# Patient Record
Sex: FEMALE | Race: BLACK OR AFRICAN AMERICAN | NOT HISPANIC OR LATINO | Employment: STUDENT | URBAN - METROPOLITAN AREA
[De-identification: names, ages, dates, MRNs, and addresses within clinical notes are randomized per-mention and may not be internally consistent; named-entity substitution may affect disease eponyms.]

---
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
## 2024-01-18 ENCOUNTER — APPOINTMENT (OUTPATIENT)
Dept: OBSTETRICS AND GYNECOLOGY | Facility: CLINIC | Age: 19
End: 2024-01-18
Payer: COMMERCIAL

## 2024-01-22 ENCOUNTER — DOCUMENTATION (OUTPATIENT)
Dept: CASE MANAGEMENT | Facility: HOSPITAL | Age: 19
End: 2024-01-22
Payer: COMMERCIAL

## 2024-01-22 NOTE — PROGRESS NOTES
Steven is a 18yo now  who delivered at 24.1wga on  after a fetal echo showing absent cardiac activity on 12/15. Parent Bereavement Program Intern, Griffin Luque, called Steven to provide bereavement support follow-up. Her phone went to  and Griffin left a message with the Program/Destiny Mcneil's direct contact information ([phone]).     PLAN: Will continue to be available for bereavement support and services as needed.      24 at 4:52 PM - Griffin Luque    This note has been reviewed and approved by Griffin Luque's supervisor:  Destiny Mcneil, ARLETH, LISW, CGP  Director of Parent Bereavement Programs    Please call [phone] or secure chat Destiny Mcneil with any questions or concerns.

## 2024-01-29 LAB — SCAN RESULT: NORMAL

## 2024-02-01 ENCOUNTER — DOCUMENTATION (OUTPATIENT)
Dept: CASE MANAGEMENT | Facility: HOSPITAL | Age: 19
End: 2024-02-01
Payer: COMMERCIAL

## 2024-02-01 NOTE — PROGRESS NOTES
Steven is a 20yo now  who delivered at 24.1wga on  after a fetal echo showing absent cardiac activity on 12/15. Parent Bereavement Program Intern, Griffin Luque, called Steven to provide bereavement support follow-up. Her phone went to  and Griffin left a message with the Program/Destiny Mcneil's direct contact information ([phone]).     PLAN: Will continue to be available for bereavement support and services as needed.      24 at 1:17 PM - Griffin Luque    This note has been reviewed and approved by Griffin Luque's supervisor:  Destiny Mcneil, MSSCAYDEN, LISW, CGP  Director of Parent Bereavement Programs    Please call [phone] or secure chat Destiny Mcneil with any questions or concerns.

## 2024-02-08 ENCOUNTER — DOCUMENTATION (OUTPATIENT)
Dept: CASE MANAGEMENT | Facility: HOSPITAL | Age: 19
End: 2024-02-08
Payer: COMMERCIAL

## 2024-02-08 NOTE — PROGRESS NOTES
Steven is a 20yo now  who delivered at 24.1wga on  after a fetal echo showing absent cardiac activity on 12/15. Parent Bereavement Program Intern, Griffin Luque, emailed Steven to provide bereavement support follow-up. Griffin emailed a message with the Program/Destiny Mcneil's direct contact information ([phone]) and words of support.    PLAN: Will continue to be available for bereavement support and services as needed.      24 at 11:50 AM - Griffin Luque    This note has been reviewed and approved by Griffin Luque's supervisor:  Destiny Mcneil, ARLETH, LISW, CGP  Director of Parent Bereavement Programs    Please call [phone] or secure chat Destiny Mcneil with any questions or concerns.

## 2024-02-23 LAB
LABORATORY COMMENT REPORT: NORMAL
PATH REPORT.ADDENDUM SPEC: NORMAL
PATH REPORT.FINAL DX SPEC: NORMAL
PATH REPORT.GROSS SPEC: NORMAL
PATH REPORT.MICROSCOPIC SPEC OTHER STN: NORMAL
PATH REPORT.RELEVANT HX SPEC: NORMAL
PATH REPORT.RELEVANT HX SPEC: NORMAL
PATH REPORT.TOTAL CANCER: NORMAL
RESIDENT REVIEW: NORMAL
RPT COMMENT SECTION: NORMAL

## 2024-03-14 ENCOUNTER — TELEPHONE (OUTPATIENT)
Dept: GENETICS | Facility: CLINIC | Age: 19
End: 2024-03-14

## 2024-03-14 NOTE — TELEPHONE ENCOUNTER
Genetics note:    Ms. Taylor did not show up to the follow-up appointment. One day prior to the appointment, our clinic called Ms. Taylor who confirmed that she will come to the clinic for a follow-up.     Whole genome sequencing for pregnancy loss came back negative. I was planning to discuss the implications of these results for her next pregnancy as well as WGS re-analysis as an option.     Report and a letter to patient to be mailed to her address.     Flaca Salas MD  Medical Geneticist

## 2024-06-12 ENCOUNTER — PHARMACY VISIT (OUTPATIENT)
Dept: PHARMACY | Facility: CLINIC | Age: 19
End: 2024-06-12
Payer: MEDICAID

## 2024-06-12 ENCOUNTER — HOSPITAL ENCOUNTER (EMERGENCY)
Facility: HOSPITAL | Age: 19
Discharge: HOME | End: 2024-06-12
Attending: EMERGENCY MEDICINE
Payer: COMMERCIAL

## 2024-06-12 VITALS
WEIGHT: 165 LBS | DIASTOLIC BLOOD PRESSURE: 71 MMHG | OXYGEN SATURATION: 100 % | RESPIRATION RATE: 16 BRPM | SYSTOLIC BLOOD PRESSURE: 121 MMHG | HEIGHT: 64 IN | BODY MASS INDEX: 28.17 KG/M2 | HEART RATE: 76 BPM | TEMPERATURE: 97.7 F

## 2024-06-12 DIAGNOSIS — A59.9 TRICHOMONAS INFECTION: ICD-10-CM

## 2024-06-12 DIAGNOSIS — N30.00 ACUTE CYSTITIS WITHOUT HEMATURIA: Primary | ICD-10-CM

## 2024-06-12 LAB
APPEARANCE UR: ABNORMAL
BILIRUB UR STRIP.AUTO-MCNC: NEGATIVE MG/DL
CLUE CELLS SPEC QL WET PREP: PRESENT
COLOR UR: YELLOW
GLUCOSE UR STRIP.AUTO-MCNC: NORMAL MG/DL
HCV AB SER QL: NONREACTIVE
HIV 1+2 AB+HIV1 P24 AG SERPL QL IA: NONREACTIVE
HOLD SPECIMEN: NORMAL
KETONES UR STRIP.AUTO-MCNC: NEGATIVE MG/DL
LEUKOCYTE ESTERASE UR QL STRIP.AUTO: ABNORMAL
MUCOUS THREADS #/AREA URNS AUTO: ABNORMAL /LPF
NITRITE UR QL STRIP.AUTO: NEGATIVE
PH UR STRIP.AUTO: 6 [PH]
PREGNANCY TEST URINE, POC: NEGATIVE
PROT UR STRIP.AUTO-MCNC: ABNORMAL MG/DL
RBC # UR STRIP.AUTO: NEGATIVE /UL
RBC #/AREA URNS AUTO: ABNORMAL /HPF
SP GR UR STRIP.AUTO: 1.02
SQUAMOUS #/AREA URNS AUTO: ABNORMAL /HPF
T VAGINALIS SPEC QL WET PREP: PRESENT
TREPONEMA PALLIDUM IGG+IGM AB [PRESENCE] IN SERUM OR PLASMA BY IMMUNOASSAY: NONREACTIVE
UROBILINOGEN UR STRIP.AUTO-MCNC: ABNORMAL MG/DL
WBC #/AREA URNS AUTO: ABNORMAL /HPF
WBC VAG QL WET PREP: ABNORMAL
YEAST VAG QL WET PREP: ABNORMAL

## 2024-06-12 PROCEDURE — 36415 COLL VENOUS BLD VENIPUNCTURE: CPT

## 2024-06-12 PROCEDURE — 99284 EMERGENCY DEPT VISIT MOD MDM: CPT

## 2024-06-12 PROCEDURE — 81025 URINE PREGNANCY TEST: CPT

## 2024-06-12 PROCEDURE — RXMED WILLOW AMBULATORY MEDICATION CHARGE

## 2024-06-12 PROCEDURE — 87086 URINE CULTURE/COLONY COUNT: CPT

## 2024-06-12 PROCEDURE — 86803 HEPATITIS C AB TEST: CPT

## 2024-06-12 PROCEDURE — 87491 CHLMYD TRACH DNA AMP PROBE: CPT

## 2024-06-12 PROCEDURE — 99283 EMERGENCY DEPT VISIT LOW MDM: CPT

## 2024-06-12 PROCEDURE — 87210 SMEAR WET MOUNT SALINE/INK: CPT

## 2024-06-12 PROCEDURE — 81001 URINALYSIS AUTO W/SCOPE: CPT

## 2024-06-12 PROCEDURE — 87389 HIV-1 AG W/HIV-1&-2 AB AG IA: CPT

## 2024-06-12 PROCEDURE — 86780 TREPONEMA PALLIDUM: CPT

## 2024-06-12 PROCEDURE — 2500000001 HC RX 250 WO HCPCS SELF ADMINISTERED DRUGS (ALT 637 FOR MEDICARE OP): Mod: SE

## 2024-06-12 RX ORDER — METRONIDAZOLE 500 MG/1
500 TABLET ORAL ONCE
Status: COMPLETED | OUTPATIENT
Start: 2024-06-12 | End: 2024-06-12

## 2024-06-12 RX ORDER — CEPHALEXIN 500 MG/1
500 CAPSULE ORAL 2 TIMES DAILY
Qty: 10 CAPSULE | Refills: 0 | Status: SHIPPED | OUTPATIENT
Start: 2024-06-12 | End: 2024-06-17

## 2024-06-12 RX ORDER — CEPHALEXIN 250 MG/1
500 CAPSULE ORAL ONCE
Status: COMPLETED | OUTPATIENT
Start: 2024-06-12 | End: 2024-06-12

## 2024-06-12 RX ORDER — METRONIDAZOLE 500 MG/1
500 TABLET ORAL 2 TIMES DAILY
Qty: 14 TABLET | Refills: 0 | Status: SHIPPED | OUTPATIENT
Start: 2024-06-12 | End: 2024-06-19

## 2024-06-12 ASSESSMENT — COLUMBIA-SUICIDE SEVERITY RATING SCALE - C-SSRS
1. IN THE PAST MONTH, HAVE YOU WISHED YOU WERE DEAD OR WISHED YOU COULD GO TO SLEEP AND NOT WAKE UP?: NO
2. HAVE YOU ACTUALLY HAD ANY THOUGHTS OF KILLING YOURSELF?: NO
6. HAVE YOU EVER DONE ANYTHING, STARTED TO DO ANYTHING, OR PREPARED TO DO ANYTHING TO END YOUR LIFE?: NO

## 2024-06-12 NOTE — ED PROVIDER NOTES
HPI   Chief Complaint   Patient presents with    Vaginal Itching       Patient is a 19-year-old female presenting to the ED with vaginal irritation.  Patient endorses 4 days of vaginal irritation and increased clear discharge.  She states she recently changed her laundry detergent from Gain to Tide as well as changed her body soap from Dove to something new 1 week ago.  She states she is still using this new soap.  Patient states her LMP was in mid May and she is not on any form of contraception.  She is sexually active with a male partner.  Patient denies any abnormal vaginal bleeding, sores, or lesions.  She otherwise denies any fever/chills, flulike symptoms, abdominal pain, nausea/vomiting, or changes in urinary or bowel habits.                [place] Coma Scale Score: 15                     Patient History   Past Medical History:   Diagnosis Date    Chlamydia     Gonorrhea      No past surgical history on file.  Family History   Problem Relation Name Age of Onset    No Known Problems Mother      No Known Problems Father      Asthma Brother      Eczema Brother      Colon cancer Paternal Grandmother  50    Diabetes Maternal Great-Grandmother       Social History     Tobacco Use    Smoking status: Never    Smokeless tobacco: Never   Vaping Use    Vaping status: Never Used   Substance Use Topics    Alcohol use: Not Currently    Drug use: Not Currently       Physical Exam   ED Triage Vitals [06/12/24 0959]   Temperature Heart Rate Respirations BP   36.5 °C (97.7 °F) 76 16 121/71      Pulse Ox Temp src Heart Rate Source Patient Position   100 % -- -- Sitting      BP Location FiO2 (%)     Right arm --       Physical Exam  Vitals reviewed. Exam conducted with a chaperone present (KEIRA Galdamez).   Constitutional:       General: She is not in acute distress.     Appearance: She is not ill-appearing.   Cardiovascular:      Rate and Rhythm: Normal rate and regular rhythm.   Pulmonary:      Effort: Pulmonary effort is  normal. No respiratory distress.      Breath sounds: Normal breath sounds.   Abdominal:      General: Bowel sounds are normal.      Palpations: Abdomen is soft.      Tenderness: There is no abdominal tenderness. There is no guarding or rebound.   Genitourinary:     Vagina: Vaginal discharge (milky white) present. No tenderness, bleeding or lesions.      Cervix: No cervical motion tenderness, friability or lesion.      Uterus: Normal.       Adnexa: Right adnexa normal and left adnexa normal.        Right: No mass, tenderness or fullness.          Left: No mass, tenderness or fullness.     Skin:     General: Skin is warm and dry.   Neurological:      General: No focal deficit present.      Mental Status: She is alert.         ED Course & MDM   Diagnoses as of 06/12/24 1222   Acute cystitis without hematuria   Trichomonas infection     Labs Reviewed   TRICHOMONAS WET PREP REFLEX TO PCR - Abnormal       Result Value    Trichomonas Present (*)     Clue Cells Present (*)     Yeast None Seen      WBC 11-20     URINALYSIS WITH REFLEX CULTURE AND MICROSCOPIC - Abnormal    Color, Urine Yellow      Appearance, Urine Turbid (*)     Specific Gravity, Urine 1.024      pH, Urine 6.0      Protein, Urine 20 (TRACE)      Glucose, Urine Normal      Blood, Urine NEGATIVE      Ketones, Urine NEGATIVE      Bilirubin, Urine NEGATIVE      Urobilinogen, Urine 4 (2+) (*)     Nitrite, Urine NEGATIVE      Leukocyte Esterase, Urine 500 Hay/µL (*)    MICROSCOPIC ONLY, URINE - Abnormal    WBC, Urine 11-20 (*)     RBC, Urine 6-10 (*)     Squamous Epithelial Cells, Urine 10-25 (FEW)      Mucus, Urine 2+     HIV 1/2 ANTIGEN/ANTIBODY SCREEN WIH REFLEX TO CONFIRMATION - Normal    HIV 1/2 Antigen/Antibody Screen with Reflex to Confirmation Nonreactive      Narrative:     HIV Ag/Ab screen is performed using the Siemens [employer] HIV Ag/Ab Combo assay which detects the presence of HIV p24 antigen as well as antibodies to HIV-1 (Group M and O) and  HIV-2.  No laboratory evidence of HIV infection. If acute HIV infection is suspected, consider testing for HIV RNA by PCR (viral load).   POCT PREGNANCY, URINE - Normal    Preg Test, Ur Negative     URINE CULTURE   C. TRACHOMATIS + N. GONORRHOEAE, AMPLIFIED   URINALYSIS WITH REFLEX CULTURE AND MICROSCOPIC    Narrative:     The following orders were created for panel order Urinalysis with Reflex Culture and Microscopic.  Procedure                               Abnormality         Status                     ---------                               -----------         ------                     Urinalysis with Reflex C...[[id number]]  Abnormal            Final result               Extra Urine Gray Tube[[id number]]                            In process                   Please view results for these tests on the individual orders.   SYPHILIS SCREENING WITH REFLEX   HEPATITIS C ANTIBODY   EXTRA URINE GRAY TUBE       Medical Decision Making  Patient is a 19-year-old female presenting to the ED with vaginal irritation.  History was obtained from the patient.  Endorses 4 days of vaginal irritation and increased clear discharge.  Recently changed with her laundry detergent and body soap.  LMP mid May and not on any form of contraception.  Sexually active with a male partner.  Denies abnormal bleeding, sores, lesions, or abdominal pain.  On physical exam, patient is sitting comfortably and in no apparent distress.  Abdomen is soft and nontender with normal bowel sounds.  No rebound, guarding, or peritoneal signs.  Pelvic exam reveals significant milky white discharge within the vagina.  No tenderness, bleeding, or lesions.  Cervix normal without CMT.  Bilateral adnexa without masses, fullness, or tenderness.  Remainder of exam as noted above.  Patient is afebrile and vital signs are stable.    Patient's exam raises high suspicion for an STD.  I have low clinical suspicion for cervicitis, PID, or TOA.  She did request the full  STD workup, which was ordered.  Urine pregnancy test negative.  Urinalysis positive for leukocytes.  Wet prep positive for clue cells and trichomonas.  Remainder of STD panel pending.  Patient was informed of these results.  She was given a dose of Keflex for her UTI and Flagyl for the trichomonas/BV.  She remained stable and ready for discharge at this time.  Patient was sent continued treatment of Keflex and Flagyl to take as indicated.  She will be called and notified with the remainder of her STD workup in the coming days.  Patient is to refrain from sexual intercourse until a week after completing her antibiotics.  She verbalizes understanding.  Return precautions were discussed.  She is agreeable and understanding to the plan and all of her questions were answered to satisfaction.        Procedure  Procedures     Colleen Talbot PA-C  06/12/24 6868

## 2024-06-12 NOTE — DISCHARGE INSTRUCTIONS
Your urine was positive for an infection.  Your STD swabs were positive for trichomonas.  The gonorrhea and chlamydia swab will not result until tomorrow.  You will be called and notified with these results.  You received antibiotics for both urinary infection and trichomonas here in the ED.  I also sent the remaining course to the [facility] pharmacy.  Keflex is used for the UTI and you are to take 1 capsule twice daily for the next 5 days.  Flagyl is for trichomonas and you are to take 1 capsule twice daily for the next 7 days.  Please take the full course of both of these antibiotics.

## 2024-06-13 LAB
BACTERIA UR CULT: NORMAL
C TRACH RRNA SPEC QL NAA+PROBE: NEGATIVE
N GONORRHOEA DNA SPEC QL PROBE+SIG AMP: POSITIVE

## 2024-06-15 ENCOUNTER — TELEPHONE (OUTPATIENT)
Dept: PHARMACY | Facility: HOSPITAL | Age: 19
End: 2024-06-15
Payer: COMMERCIAL

## 2024-06-15 NOTE — TELEPHONE ENCOUNTER
I reviewed the progress note and agree with the resident’s findings and plans as written. Case discussed with resident.    Meaghan Velasco, PrestonD

## 2024-06-15 NOTE — PROGRESS NOTES
EDPD Note: Initiation     Contacted Steven Taylor regarding a positive Gonorrhea culture/result that was taken during their recent emergency room visit. I completed education with patient . The patient is not being treated appropriately.    Patient was recommended to return to ED for single dose of Rocephin 500mg IM injection.    Patient previously aware of (+) Trichomonas and BV cultures. Being treated appropriately with Metronidazole 500mg BID x7 days. Recommended to complete course of therapy and follow-up w/PCP if Sx do not fully resolve.    Patient expressed understanding-no questions.    No further follow up needed from EDPD Team. If there are any other questions for the ED Post-Discharge Culture Follow Up Team, please contact [phone]. Fax: [phone].    Amy Marina RPh

## 2024-06-15 NOTE — TELEPHONE ENCOUNTER
EDPD Note: Negative Results    The EDPD Post-Discharge Team was called regarding Steven Taylor  Syphilis, HIV, Hep C, Chlamydia, and Urine culture/result that was taken during their recent emergency room visit. I gave patient  their results. The culture/result were negative and there were no other questions at this time.    Since Urine culture showed no growth, patient instructed to discontinue Keflex, as it is not indicated at this time.    If there are any other questions for the ED Post-Discharge Culture Follow Up Team, please contact [phone]. Fax: [phone].    Amy Marina [facility]

## 2024-07-23 ENCOUNTER — HOSPITAL ENCOUNTER (EMERGENCY)
Facility: HOSPITAL | Age: 19
Discharge: HOME | End: 2024-07-23
Payer: COMMERCIAL

## 2024-07-23 VITALS
OXYGEN SATURATION: 98 % | WEIGHT: 165 LBS | BODY MASS INDEX: 28.32 KG/M2 | HEART RATE: 85 BPM | RESPIRATION RATE: 16 BRPM | TEMPERATURE: 97.5 F | DIASTOLIC BLOOD PRESSURE: 76 MMHG | SYSTOLIC BLOOD PRESSURE: 117 MMHG

## 2024-07-23 DIAGNOSIS — R10.13 EPIGASTRIC PAIN: Primary | ICD-10-CM

## 2024-07-23 LAB
ALBUMIN SERPL BCP-MCNC: 4 G/DL (ref 3.4–5)
ALP SERPL-CCNC: 55 U/L (ref 33–110)
ALT SERPL W P-5'-P-CCNC: 9 U/L (ref 7–45)
ANION GAP SERPL CALC-SCNC: 12 MMOL/L (ref 10–20)
APPEARANCE UR: CLEAR
AST SERPL W P-5'-P-CCNC: 14 U/L (ref 9–39)
BASOPHILS # BLD AUTO: 0.03 X10*3/UL (ref 0–0.1)
BASOPHILS NFR BLD AUTO: 0.4 %
BILIRUB SERPL-MCNC: 0.6 MG/DL (ref 0–1.2)
BILIRUB UR STRIP.AUTO-MCNC: NEGATIVE MG/DL
BUN SERPL-MCNC: 7 MG/DL (ref 6–23)
CALCIUM SERPL-MCNC: 8.7 MG/DL (ref 8.6–10.6)
CHLORIDE SERPL-SCNC: 107 MMOL/L (ref 98–107)
CLUE CELLS SPEC QL WET PREP: NORMAL
CO2 SERPL-SCNC: 25 MMOL/L (ref 21–32)
COLOR UR: ABNORMAL
CREAT SERPL-MCNC: 0.73 MG/DL (ref 0.5–1.05)
EGFRCR SERPLBLD CKD-EPI 2021: >90 ML/MIN/1.73M*2
EOSINOPHIL # BLD AUTO: 0.09 X10*3/UL (ref 0–0.7)
EOSINOPHIL NFR BLD AUTO: 1.2 %
ERYTHROCYTE [DISTWIDTH] IN BLOOD BY AUTOMATED COUNT: 15.8 % (ref 11.5–14.5)
GLUCOSE SERPL-MCNC: 85 MG/DL (ref 74–99)
GLUCOSE UR STRIP.AUTO-MCNC: NORMAL MG/DL
HCT VFR BLD AUTO: 37.1 % (ref 36–46)
HCV AB SER QL: NONREACTIVE
HGB BLD-MCNC: 11.6 G/DL (ref 12–16)
HIV 1+2 AB+HIV1 P24 AG SERPL QL IA: NONREACTIVE
IMM GRANULOCYTES # BLD AUTO: 0.01 X10*3/UL (ref 0–0.7)
IMM GRANULOCYTES NFR BLD AUTO: 0.1 % (ref 0–0.9)
KETONES UR STRIP.AUTO-MCNC: NEGATIVE MG/DL
LEUKOCYTE ESTERASE UR QL STRIP.AUTO: ABNORMAL
LIPASE SERPL-CCNC: 42 U/L (ref 9–82)
LYMPHOCYTES # BLD AUTO: 1.96 X10*3/UL (ref 1.2–4.8)
LYMPHOCYTES NFR BLD AUTO: 25.9 %
MCH RBC QN AUTO: 26.4 PG (ref 26–34)
MCHC RBC AUTO-ENTMCNC: 31.3 G/DL (ref 32–36)
MCV RBC AUTO: 85 FL (ref 80–100)
MONOCYTES # BLD AUTO: 0.35 X10*3/UL (ref 0.1–1)
MONOCYTES NFR BLD AUTO: 4.6 %
MUCOUS THREADS #/AREA URNS AUTO: NORMAL /LPF
NEUTROPHILS # BLD AUTO: 5.13 X10*3/UL (ref 1.2–7.7)
NEUTROPHILS NFR BLD AUTO: 67.8 %
NITRITE UR QL STRIP.AUTO: NEGATIVE
NRBC BLD-RTO: 0 /100 WBCS (ref 0–0)
PH UR STRIP.AUTO: 7 [PH]
PLATELET # BLD AUTO: 185 X10*3/UL (ref 150–450)
POTASSIUM SERPL-SCNC: 4.5 MMOL/L (ref 3.5–5.3)
PREGNANCY TEST URINE, POC: NEGATIVE
PROT SERPL-MCNC: 7.3 G/DL (ref 6.4–8.2)
PROT UR STRIP.AUTO-MCNC: ABNORMAL MG/DL
RBC # BLD AUTO: 4.39 X10*6/UL (ref 4–5.2)
RBC # UR STRIP.AUTO: NEGATIVE /UL
RBC #/AREA URNS AUTO: NORMAL /HPF
SODIUM SERPL-SCNC: 139 MMOL/L (ref 136–145)
SP GR UR STRIP.AUTO: 1.03
SQUAMOUS #/AREA URNS AUTO: NORMAL /HPF
T VAGINALIS SPEC QL WET PREP: NORMAL
TREPONEMA PALLIDUM IGG+IGM AB [PRESENCE] IN SERUM OR PLASMA BY IMMUNOASSAY: NONREACTIVE
UROBILINOGEN UR STRIP.AUTO-MCNC: ABNORMAL MG/DL
WBC # BLD AUTO: 7.6 X10*3/UL (ref 4.4–11.3)
WBC #/AREA URNS AUTO: NORMAL /HPF
WBC VAG QL WET PREP: NORMAL
YEAST VAG QL WET PREP: NORMAL

## 2024-07-23 PROCEDURE — 87389 HIV-1 AG W/HIV-1&-2 AB AG IA: CPT | Performed by: PHYSICIAN ASSISTANT

## 2024-07-23 PROCEDURE — 80053 COMPREHEN METABOLIC PANEL: CPT | Performed by: PHYSICIAN ASSISTANT

## 2024-07-23 PROCEDURE — 2500000001 HC RX 250 WO HCPCS SELF ADMINISTERED DRUGS (ALT 637 FOR MEDICARE OP): Mod: SE | Performed by: PHYSICIAN ASSISTANT

## 2024-07-23 PROCEDURE — 81025 URINE PREGNANCY TEST: CPT | Performed by: PHYSICIAN ASSISTANT

## 2024-07-23 PROCEDURE — 2500000005 HC RX 250 GENERAL PHARMACY W/O HCPCS: Mod: SE | Performed by: PHYSICIAN ASSISTANT

## 2024-07-23 PROCEDURE — 86780 TREPONEMA PALLIDUM: CPT | Performed by: PHYSICIAN ASSISTANT

## 2024-07-23 PROCEDURE — 85025 COMPLETE CBC W/AUTO DIFF WBC: CPT | Performed by: PHYSICIAN ASSISTANT

## 2024-07-23 PROCEDURE — 87210 SMEAR WET MOUNT SALINE/INK: CPT | Performed by: PHYSICIAN ASSISTANT

## 2024-07-23 PROCEDURE — 99283 EMERGENCY DEPT VISIT LOW MDM: CPT

## 2024-07-23 PROCEDURE — 87491 CHLMYD TRACH DNA AMP PROBE: CPT | Performed by: PHYSICIAN ASSISTANT

## 2024-07-23 PROCEDURE — 36415 COLL VENOUS BLD VENIPUNCTURE: CPT | Performed by: PHYSICIAN ASSISTANT

## 2024-07-23 PROCEDURE — 87086 URINE CULTURE/COLONY COUNT: CPT | Performed by: PHYSICIAN ASSISTANT

## 2024-07-23 PROCEDURE — 81001 URINALYSIS AUTO W/SCOPE: CPT | Performed by: PHYSICIAN ASSISTANT

## 2024-07-23 PROCEDURE — 99284 EMERGENCY DEPT VISIT MOD MDM: CPT | Performed by: PHYSICIAN ASSISTANT

## 2024-07-23 PROCEDURE — 86803 HEPATITIS C AB TEST: CPT | Performed by: PHYSICIAN ASSISTANT

## 2024-07-23 PROCEDURE — 83690 ASSAY OF LIPASE: CPT | Performed by: PHYSICIAN ASSISTANT

## 2024-07-23 RX ORDER — DICYCLOMINE HYDROCHLORIDE 10 MG/1
20 CAPSULE ORAL ONCE
Status: COMPLETED | OUTPATIENT
Start: 2024-07-23 | End: 2024-07-23

## 2024-07-23 RX ORDER — DICYCLOMINE HYDROCHLORIDE 20 MG/1
20 TABLET ORAL 2 TIMES DAILY
Qty: 20 TABLET | Refills: 0 | Status: SHIPPED | OUTPATIENT
Start: 2024-07-23 | End: 2024-08-02

## 2024-07-23 ASSESSMENT — COLUMBIA-SUICIDE SEVERITY RATING SCALE - C-SSRS
6. HAVE YOU EVER DONE ANYTHING, STARTED TO DO ANYTHING, OR PREPARED TO DO ANYTHING TO END YOUR LIFE?: NO
2. HAVE YOU ACTUALLY HAD ANY THOUGHTS OF KILLING YOURSELF?: NO
1. IN THE PAST MONTH, HAVE YOU WISHED YOU WERE DEAD OR WISHED YOU COULD GO TO SLEEP AND NOT WAKE UP?: NO

## 2024-07-23 NOTE — ED PROVIDER NOTES
HPI   Chief Complaint   Patient presents with    Abdominal Pain    Exposure to STD       HPI: Patient is a 19-year-old female who is otherwise healthy presents to the ED for abdominal pain and STD check.  Patient states that her abdominal pain started yesterday.  She localizes it to the epigastric region and left upper quadrant of her abdomen.  She notes associated diarrhea but denies any nausea, vomiting, fevers, chills, dysuria, hematuria, constipation, vaginal discharge.  She denies eating anything abnormal prior to her symptoms occurring yesterday.  States that patient would also like checked for STDs while she is here.  ------------------------------------------------------------------------------------------------------------------------------------------  ROS: a ten point review of systems was performed and was negative except as per HPI.  ------------------------------------------------------------------------------------------------------------------------------------------  PMH / PSH: as per HPI, otherwise reviewed   MEDS: as per HPI, otherwise reviewed in EMR  ALLERGIES: as per HPI, otherwise reviewed in EMR  SocH:  as per HPI, otherwise reviewed in EMR  FH:  as per HPI, otherwise reviewed in EMR   ------------------------------------------------------------------------------------------------------------------------------------------  Physical Exam:  VS: As documented in the triage note and EMR flowsheet from this visit was reviewed  General: Well appearing. No acute distress.   Eyes:  Extraocular movements grossly intact. No scleral icterus.   Head: Atraumatic. Normocephalic.     Neck: No meningismus. No gross masses. Full movement through range of motion  CV: Regular rhythm. No murmurs, rubs, gallops appreciated.   Resp: Clear to auscultation bilaterally. No respiratory distress.    GI: Nontender. Soft. No masses. No rebound, rigidity or guarding.   MSK: Symmetric muscle bulk. No gross step offs or  deformities.  Skin: Warm, dry. No rashes  Neuro: CN II-VII intact. A&O x3. Speech fluent. Alert. Moving all extremities. Ambulates with normal gait  Psych: Appropriate mood and affect for situation  ------------------------------------------------------------------------------------------------------------------------------------------  Hospital Course / Medical Decision Making: Patient is a 19-year-old female who presents to the ED for abdominal pain and STD check. Patient endorsing pain to her left upper quadrant and epigastrium.  On examination is well-appearing.  Vitals are stable.  Abdominal examination is benign.  Notes associated diarrhea but denies any other symptoms.  Patient administered Bentyl and BMX for symptom management.  Differential diagnosis includes but is not limited to gastritis versus pancreatitis versus dyspepsia versus viral gastroenteritis.  CBC showed no evidence of leukocytosis, stable anemia noted with hemoglobin of 11.6.  CMP without renal, electrolyte or hepatic abnormality.  Lipase within normal range.  Urinalysis without evidence of infection.  Wet prep negative for any clue cells, trichomonas or yeast.  On reassessment, patient is feeling improved.  She was written a prescription for Bentyl. As a result of the work-up, the patient was discharged home in stable condition.  They were informed of the diagnosis and instructed to come back with any concerns or worsening of condition.  Agreeable to the plan as discussed above.  Patient given the opportunity to ask questions.  All of the patient's questions were answered.               Patient History   Past Medical History:   Diagnosis Date    Chlamydia     Gonorrhea      No past surgical history on file.  Family History   Problem Relation Name Age of Onset    No Known Problems Mother      No Known Problems Father      Asthma Brother      Eczema Brother      Colon cancer Paternal Grandmother  50    Diabetes Maternal Great-Grandmother        Social History     Tobacco Use    Smoking status: Never    Smokeless tobacco: Never   Vaping Use    Vaping status: Never Used   Substance Use Topics    Alcohol use: Not Currently    Drug use: Not Currently       Physical Exam   ED Triage Vitals [07/23/24 1831]   Temperature Heart Rate Respirations BP   36.4 °C (97.5 °F) 85 16 117/76      Pulse Ox Temp src Heart Rate Source Patient Position   98 % -- -- Sitting      BP Location FiO2 (%)     Right arm --       Physical Exam      ED Course & MDM   Diagnoses as of 07/23/24 2232   Epigastric pain                       [place] Coma Scale Score: 15                        Medical Decision Making      Procedure  Procedures     Julissa Mcclelland PA-C  07/23/24 2234

## 2024-07-24 LAB
C TRACH RRNA SPEC QL NAA+PROBE: POSITIVE
N GONORRHOEA DNA SPEC QL PROBE+SIG AMP: NEGATIVE

## 2024-07-25 LAB — BACTERIA UR CULT: NORMAL

## 2024-07-26 ENCOUNTER — TELEPHONE (OUTPATIENT)
Dept: PHARMACY | Facility: HOSPITAL | Age: 19
End: 2024-07-26
Payer: COMMERCIAL

## 2024-07-26 DIAGNOSIS — A74.9 CHLAMYDIA: Primary | ICD-10-CM

## 2024-07-26 RX ORDER — DOXYCYCLINE 100 MG/1
100 CAPSULE ORAL 2 TIMES DAILY
Qty: 14 CAPSULE | Refills: 0 | Status: SHIPPED | OUTPATIENT
Start: 2024-07-26 | End: 2024-08-02

## 2024-07-26 NOTE — PROGRESS NOTES
EDPD Note: Initiation     Contacted Steven Taylor regarding a positive Chlamydia culture/result that was taken during their recent emergency room visit. I completed education with patient . The patient is not being treated appropriately. Patient was encouraged to abstain from sexual intercourse for at least 7-14 days or after completion of treatment.  Patient was also encouraged to talk to their sex partners so they can seek treatment as well. Patient made aware that if they experience any signs/symptoms to go to ED for further evaluation. Made aware to follow up with PCP. Patient verbalized understanding and had no further questions or concerns.  The following prescription was sent to the patient's preferred pharmacy. No further follow up needed from EDPD Team.     Patient declined expedited partner therapy services at this time.     Drug: Doxycycline 100mg  Si tab BID   Qty: 14  Days Supply: 7    If there are any other questions for the ED Post-Discharge Culture Follow Up Team, please contact [phone]. Fax: [phone].    Mariah Bryant, PharmD

## 2024-10-11 ENCOUNTER — TELEPHONE (OUTPATIENT)
Dept: PRIMARY CARE | Facility: CLINIC | Age: 19
End: 2024-10-11
Payer: COMMERCIAL

## 2024-10-18 ENCOUNTER — HOSPITAL ENCOUNTER (EMERGENCY)
Facility: HOSPITAL | Age: 19
Discharge: HOME | End: 2024-10-18
Attending: EMERGENCY MEDICINE
Payer: COMMERCIAL

## 2024-10-18 VITALS
HEIGHT: 65 IN | DIASTOLIC BLOOD PRESSURE: 87 MMHG | SYSTOLIC BLOOD PRESSURE: 132 MMHG | HEART RATE: 90 BPM | OXYGEN SATURATION: 98 % | WEIGHT: 151 LBS | BODY MASS INDEX: 25.16 KG/M2 | TEMPERATURE: 99.3 F | RESPIRATION RATE: 18 BRPM

## 2024-10-18 DIAGNOSIS — M54.10 RADICULAR LOW BACK PAIN: Primary | ICD-10-CM

## 2024-10-18 DIAGNOSIS — N76.0 BACTERIAL VAGINOSIS: ICD-10-CM

## 2024-10-18 DIAGNOSIS — B96.89 BACTERIAL VAGINOSIS: ICD-10-CM

## 2024-10-18 LAB
APPEARANCE UR: ABNORMAL
BILIRUB UR STRIP.AUTO-MCNC: NEGATIVE MG/DL
CLUE CELLS SPEC QL WET PREP: PRESENT
COLOR UR: YELLOW
GLUCOSE UR STRIP.AUTO-MCNC: NORMAL MG/DL
HCG UR QL IA.RAPID: NEGATIVE
KETONES UR STRIP.AUTO-MCNC: NEGATIVE MG/DL
LEUKOCYTE ESTERASE UR QL STRIP.AUTO: ABNORMAL
MUCOUS THREADS #/AREA URNS AUTO: ABNORMAL /LPF
NITRITE UR QL STRIP.AUTO: NEGATIVE
PH UR STRIP.AUTO: 7 [PH]
PROT UR STRIP.AUTO-MCNC: ABNORMAL MG/DL
RBC # UR STRIP.AUTO: ABNORMAL /UL
RBC #/AREA URNS AUTO: >20 /HPF
SP GR UR STRIP.AUTO: 1.03
SQUAMOUS #/AREA URNS AUTO: ABNORMAL /HPF
T VAGINALIS SPEC QL WET PREP: ABNORMAL
UROBILINOGEN UR STRIP.AUTO-MCNC: ABNORMAL MG/DL
WBC #/AREA URNS AUTO: ABNORMAL /HPF
WBC VAG QL WET PREP: ABNORMAL
YEAST VAG QL WET PREP: ABNORMAL

## 2024-10-18 PROCEDURE — 87210 SMEAR WET MOUNT SALINE/INK: CPT

## 2024-10-18 PROCEDURE — 99283 EMERGENCY DEPT VISIT LOW MDM: CPT

## 2024-10-18 PROCEDURE — 81003 URINALYSIS AUTO W/O SCOPE: CPT

## 2024-10-18 PROCEDURE — 81025 URINE PREGNANCY TEST: CPT

## 2024-10-18 PROCEDURE — 2500000004 HC RX 250 GENERAL PHARMACY W/ HCPCS (ALT 636 FOR OP/ED): Mod: SE

## 2024-10-18 PROCEDURE — 99284 EMERGENCY DEPT VISIT MOD MDM: CPT | Performed by: EMERGENCY MEDICINE

## 2024-10-18 PROCEDURE — 96372 THER/PROPH/DIAG INJ SC/IM: CPT

## 2024-10-18 PROCEDURE — 87086 URINE CULTURE/COLONY COUNT: CPT

## 2024-10-18 PROCEDURE — 2500000005 HC RX 250 GENERAL PHARMACY W/O HCPCS: Mod: SE

## 2024-10-18 PROCEDURE — 87491 CHLMYD TRACH DNA AMP PROBE: CPT

## 2024-10-18 RX ORDER — LIDOCAINE 560 MG/1
1 PATCH PERCUTANEOUS; TOPICAL; TRANSDERMAL DAILY
Status: DISCONTINUED | OUTPATIENT
Start: 2024-10-18 | End: 2024-10-18 | Stop reason: HOSPADM

## 2024-10-18 RX ORDER — LIDOCAINE 50 MG/G
1 PATCH TOPICAL DAILY
Qty: 5 PATCH | Refills: 0 | Status: SHIPPED | OUTPATIENT
Start: 2024-10-18

## 2024-10-18 RX ORDER — KETOROLAC TROMETHAMINE 15 MG/ML
15 INJECTION, SOLUTION INTRAMUSCULAR; INTRAVENOUS ONCE
Status: COMPLETED | OUTPATIENT
Start: 2024-10-18 | End: 2024-10-18

## 2024-10-18 RX ORDER — METRONIDAZOLE 500 MG/1
500 TABLET ORAL 3 TIMES DAILY
Qty: 21 TABLET | Refills: 0 | Status: SHIPPED | OUTPATIENT
Start: 2024-10-18 | End: 2024-10-25

## 2024-10-18 RX ORDER — KETOROLAC TROMETHAMINE 10 MG/1
10 TABLET, FILM COATED ORAL EVERY 6 HOURS PRN
Qty: 20 TABLET | Refills: 0 | Status: SHIPPED | OUTPATIENT
Start: 2024-10-18 | End: 2024-10-23

## 2024-10-18 ASSESSMENT — COLUMBIA-SUICIDE SEVERITY RATING SCALE - C-SSRS
2. HAVE YOU ACTUALLY HAD ANY THOUGHTS OF KILLING YOURSELF?: NO
6. HAVE YOU EVER DONE ANYTHING, STARTED TO DO ANYTHING, OR PREPARED TO DO ANYTHING TO END YOUR LIFE?: NO
1. IN THE PAST MONTH, HAVE YOU WISHED YOU WERE DEAD OR WISHED YOU COULD GO TO SLEEP AND NOT WAKE UP?: NO

## 2024-10-18 ASSESSMENT — PAIN SCALES - GENERAL: PAINLEVEL_OUTOF10: 5 - MODERATE PAIN

## 2024-10-18 ASSESSMENT — LIFESTYLE VARIABLES
EVER HAD A DRINK FIRST THING IN THE MORNING TO STEADY YOUR NERVES TO GET RID OF A HANGOVER: NO
HAVE PEOPLE ANNOYED YOU BY CRITICIZING YOUR DRINKING: NO
EVER FELT BAD OR GUILTY ABOUT YOUR DRINKING: NO
TOTAL SCORE: 0
HAVE YOU EVER FELT YOU SHOULD CUT DOWN ON YOUR DRINKING: NO

## 2024-10-18 ASSESSMENT — PAIN - FUNCTIONAL ASSESSMENT: PAIN_FUNCTIONAL_ASSESSMENT: 0-10

## 2024-10-18 ASSESSMENT — PAIN DESCRIPTION - LOCATION: LOCATION: BACK

## 2024-10-18 ASSESSMENT — PAIN DESCRIPTION - PAIN TYPE: TYPE: ACUTE PAIN

## 2024-10-18 NOTE — ED PROVIDER NOTES
"Emergency Department Provider Note        History of Present Illness     History provided by: Patient  Limitations to History: None  External Records Reviewed with Brief Summary: None    HPI:  Steven Taylor is a 19 y.o. female with no significant PMH presenting for right back pain. Patient states she works for [employer] and has been dealing with right radicular back pain for the last two months. It was atraumatic in nature. She states yesterday she had a long day at work and when she came home was shaking in pain. She states its always the right side starting near her buttock and radiating down the side of the right leg. She denies numbness, tingling, loss of bowel or bladder function, trauma, midline pain, history of cancer or IVDU. She endorses abnormal vaginal discharge that she first describes as \"slimy\" then as thick white. No dysuria, hematuria, abdominal pain, or nausea.     Physical Exam   Triage vitals:  T 37.4 °C (99.3 °F)  HR 90  /87  RR 18  O2 98 % None (Room air)    General: Awake, alert, in no acute distress  Eyes: Gaze conjugate.  No scleral icterus or injection  HENT: Normo-cephalic, atraumatic. No stridor  CV: Regular rate, regular rhythm. Radial pulses 2+ bilaterally  Resp: Breathing non-labored, speaking in full sentences.  Clear to auscultation bilaterally  GI: Soft, non-distended, non-tender. No rebound or guarding.  MSK/Extremities: No gross bony deformities. Moving all extremities. No midline CTL spine tenderness, step offs or deformities. No CVA tenderness. Tender to palpation over the left buttock   Skin: Warm. Appropriate color  Neuro: Alert. Oriented. Face symmetric. Speech is fluent.  Gross strength and sensation intact in b/l UE and LEs  Psych: Appropriate mood and affect    Medical Decision Making & ED Course   Medical Decision Makin y.o. female with no PMH presenting for radicular leg pain. She is hemodynamically stable and in no acute distress on arrival.  She has no new " numbness/weakness/tingling in either leg, bowel/bladder incontinence, new trauma, fever, unexpected weight loss, h/o cancer, h/o IVDU, or indwelling lines. No midline CT LS spine tenderness palpation or step-offs.  5 out of 5 strength in hip and knee flexion extension as well as ankle and great toe plantar dorsiflexion bilaterally.  Normal sensation to light touch in L1-S4 nerve roots bilaterally.  2+ DP pulses bilaterally.  To ambulate with a steady gait without foot drop.  No concern for osteomyelitis, discitis, epidural abscess, cord compression, fracture, or cauda equina.  I do not believe they require MRI or CT imaging at this time.  Patient treated symptomatically with a lidocaine patch and toradol which she states significantly improved the pain. She had also complained of vaginal discharge changes however had no abdominal tenderness, nausea or fevers so self swabbed. Her initial labs were positive for clue cells. Patient offered empiric treatment with gonorrhea and chlamydia but declines. We will follow up if these tests are positive and she was discharged of flagyl, toradol and lidocaine patches. She was also given a referral to primary care for further follow up. Discharged in stable condition.     ----      Differential diagnoses considered include but are not limited to: nephro, pyelo, sciatica, fracture, MSK strain, STI, PID     Social Determinants of Health which Significantly Impact Care: None identified     EKG Independent Interpretation: EKG not obtained    Independent Result Review and Interpretation: Relevant laboratory and radiographic results were reviewed and independently interpreted by myself.  As necessary, they are commented on in the ED Course.    Chronic conditions affecting the patient's care: As documented above in [facility]    The patient was discussed with the following consultants/services: None    Care Considerations: As documented above in [facility]    ED Course:  ED Course as of 10/18/24 5607    Fri Oct 18, 2024   2038 HCG, Urine: NEGATIVE [AW]   2105 Clue Cells(!): Present [AW]      ED Course User Index  [AW] Selam Parrish DO         Diagnoses as of 10/18/24 2147   Bacterial vaginosis   Radicular low back pain     Disposition   As a result of the work-up, the patient was discharged home.  she was informed of her diagnosis and instructed to come back with any concerns or worsening of condition.  she and was agreeable to the plan as discussed above.  she was given the opportunity to ask questions.  All of the patient's questions were answered.    Procedures   Procedures    Patient seen and discussed with ED attending physician.    Selam Parrish DO  Emergency Medicine       Selam Parrish DO  Resident  10/18/24 7941

## 2024-10-18 NOTE — Clinical Note
Steven Taylor was seen and treated in our emergency department on 10/18/2024.  She may return to work on 10/21/2024.       If you have any questions or concerns, please don't hesitate to call.      Selam Parrish, DO

## 2024-10-18 NOTE — Clinical Note
Steevn Taylor was seen and treated in our emergency department on 10/18/2024.  She may return to work on 10/21/2024.       If you have any questions or concerns, please don't hesitate to call.      Selam Parrish, DO

## 2024-10-18 NOTE — ED TRIAGE NOTES
Patient states that she has had left side lower back pain that shoots into her left leg. Patient states that feels similar to pain she had when she got her epideral last December.

## 2024-10-19 LAB
BACTERIA UR CULT: NORMAL
C TRACH RRNA SPEC QL NAA+PROBE: NEGATIVE
C TRACH RRNA SPEC QL NAA+PROBE: NEGATIVE
HOLD SPECIMEN: NORMAL
N GONORRHOEA DNA SPEC QL PROBE+SIG AMP: NEGATIVE
N GONORRHOEA DNA SPEC QL PROBE+SIG AMP: NEGATIVE

## 2024-10-19 NOTE — DISCHARGE INSTRUCTIONS
You were treated here for concern for sexually-transmitted infection.  It is likely that we do not know for sure that you have this disease today, you will be called if the swabs or urine test done today are positive.  If you were prescribed medications to take at home please continue to take those.  Please have any partners that you are sexually active with be tested and treated.  You should not be sexually active with until you have completed treatment.    --   [facility]   Department of Family Medicine and Community [facility]  Phone: [phone]

## 2024-10-28 PROCEDURE — RXMED WILLOW AMBULATORY MEDICATION CHARGE

## 2024-11-04 ENCOUNTER — PHARMACY VISIT (OUTPATIENT)
Dept: PHARMACY | Facility: CLINIC | Age: 19
End: 2024-11-04
Payer: MEDICAID

## 2025-07-09 ENCOUNTER — HOSPITAL ENCOUNTER (EMERGENCY)
Facility: HOSPITAL | Age: 20
Discharge: HOME | End: 2025-07-09
Payer: COMMERCIAL

## 2025-07-09 VITALS
OXYGEN SATURATION: 100 % | HEIGHT: 65 IN | BODY MASS INDEX: 22.49 KG/M2 | RESPIRATION RATE: 16 BRPM | DIASTOLIC BLOOD PRESSURE: 68 MMHG | HEART RATE: 67 BPM | TEMPERATURE: 97.7 F | WEIGHT: 135 LBS | SYSTOLIC BLOOD PRESSURE: 113 MMHG

## 2025-07-09 DIAGNOSIS — M79.674 PAIN OF TOE OF RIGHT FOOT: Primary | ICD-10-CM

## 2025-07-09 LAB — PREGNANCY TEST URINE, POC: NEGATIVE

## 2025-07-09 PROCEDURE — 99282 EMERGENCY DEPT VISIT SF MDM: CPT | Mod: 25

## 2025-07-09 PROCEDURE — 81025 URINE PREGNANCY TEST: CPT

## 2025-07-09 PROCEDURE — 99284 EMERGENCY DEPT VISIT MOD MDM: CPT

## 2025-07-09 PROCEDURE — 2500000004 HC RX 250 GENERAL PHARMACY W/ HCPCS (ALT 636 FOR OP/ED): Mod: JZ,SE

## 2025-07-09 PROCEDURE — 90715 TDAP VACCINE 7 YRS/> IM: CPT | Mod: JZ,SE

## 2025-07-09 PROCEDURE — 90471 IMMUNIZATION ADMIN: CPT

## 2025-07-09 RX ADMIN — TETANUS TOXOID, REDUCED DIPHTHERIA TOXOID AND ACELLULAR PERTUSSIS VACCINE, ADSORBED 0.5 ML: 5; 2.5; 8; 8; 2.5 SUSPENSION INTRAMUSCULAR at 12:53

## 2025-07-09 ASSESSMENT — PAIN DESCRIPTION - DESCRIPTORS: DESCRIPTORS: ACHING;THROBBING

## 2025-07-09 ASSESSMENT — PAIN DESCRIPTION - ORIENTATION: ORIENTATION: RIGHT

## 2025-07-09 ASSESSMENT — PAIN DESCRIPTION - PROGRESSION: CLINICAL_PROGRESSION: NOT CHANGED

## 2025-07-09 ASSESSMENT — PAIN DESCRIPTION - PAIN TYPE: TYPE: ACUTE PAIN

## 2025-07-09 ASSESSMENT — PAIN DESCRIPTION - ONSET: ONSET: ONGOING

## 2025-07-09 ASSESSMENT — PAIN DESCRIPTION - LOCATION: LOCATION: TOE (COMMENT WHICH ONE)

## 2025-07-09 ASSESSMENT — PAIN - FUNCTIONAL ASSESSMENT: PAIN_FUNCTIONAL_ASSESSMENT: 0-10

## 2025-07-09 ASSESSMENT — PAIN SCALES - GENERAL: PAINLEVEL_OUTOF10: 5 - MODERATE PAIN

## 2025-07-09 ASSESSMENT — PAIN DESCRIPTION - FREQUENCY: FREQUENCY: CONSTANT/CONTINUOUS

## 2025-07-09 NOTE — ED PROVIDER NOTES
History of Present Illness       History provided by: Patient  Limitations to History: None  External Records Reviewed with Brief Summary: None    HPI:  HPI     This is a 20-year-old female presented to the ED for right big toe pain.  Patient states she was outside yesterday barefoot around 5/6 PM when she believes she stepped on an unknown object.  States the toe did not bleed nor was there any visible foreign body.  States today she feels an odd sensation with throbbing pain that comes and goes.  States after the injury she did shower and apply peroxide to the toe.  Denies fever, chills, nausea/vomiting, pain elsewhere.  States the toe has not blood nor has she noted any drainage or redness to the toe.  Patient unsure of when her last tetanus vaccine was.  States yesterday he bought another toe was more bruised but has improved today.  States she does wear steel toed shoes for work and is often walking around on her feet.    Physical Exam   Physical Exam  Constitutional:       Appearance: Normal appearance.   HENT:      Head: Normocephalic.      Nose: Nose normal.      Mouth/Throat:      Mouth: Mucous membranes are moist.   Eyes:      Extraocular Movements: Extraocular movements intact.   Cardiovascular:      Rate and Rhythm: Normal rate and regular rhythm.   Pulmonary:      Effort: Pulmonary effort is normal.      Breath sounds: Normal breath sounds.   Abdominal:      Tenderness: There is no abdominal tenderness.   Musculoskeletal:      Cervical back: Normal range of motion.      Comments: Plantar surface of big toe with quarter size ecchymosis.  No obvious puncture wound or foreign body.  No bleeding or purulent drainage.  Slightly tender to palpation.  No surrounding erythema or signs of cellulitis.  Without swelling.  Normal ROM of the digits.  Nontender throughout the rest of the foot.  No palpable deformity   Neurological:      General: No focal deficit present.      Mental Status: She is alert and  "oriented to person, place, and time.   Psychiatric:         Mood and Affect: Mood normal.         Behavior: Behavior normal.         Thought Content: Thought content normal.          Triage vitals:  T 36.5 °C (97.7 °F)  HR 67  /68  RR 16  O2 100 % None (Room air)    Medical Decision Making & ED Course     ED Course & MDM       Medical Decision Making:  Medical Decision Making   This is a 20-year-old female presented to the ED for right big toe pain.  Upon arrival to the ED patient is alert and oriented in no acute distress.  She is vitally stable and afebrile.  On exam patient does have quarter sized ecchymosis under the right big toe that is slightly tender to palpation.  No visible foreign body or puncture wound.  No purulent drainage or bleeding from the toe.  Normal ROM.  No surrounding erythema or signs of cellulitis.  No palpable deformities.  With no puncture wound or signs of infection, do not feel antibiotics warranted at this time although did have discussion with patient on signs/symptoms to be aware of in which she should return to the ED for.  Offered x-ray which patient declined; low suspicion for osteomyelitis.  I did provide tetanus vaccination to ensure coverage.    Patient discharged in stable condition with work note provided.  Advised to follow with primary care provider to ensure improvement of the toe ecchymosis.  Return to ED for signs/symptoms of infection as discussed.  Take Tylenol/ibuprofen as needed for pain management.  Rest and ice the toe.  All questions answered and patient agreed to this plan.  ----      Differential diagnoses considered include but are not limited to: Osteomyelitis, infection, ecchymosis, bony abnormality, hemorrhagic blister         Visit Vitals  /68 (BP Location: Right arm, Patient Position: Sitting)   Pulse 67   Temp 36.5 °C (97.7 °F) (Temporal)   Resp 16   Ht 1.651 m (5' 5\")   Wt 61.2 kg (135 lb)   SpO2 100%   BMI 22.47 kg/m²   OB Status Recent " pregnancy   Smoking Status Never   BSA 1.68 m²        Labs Reviewed   POCT PREGNANCY, URINE - Normal       Result Value    Preg Test, Ur Negative         No orders to display       ED Course:  Diagnoses as of 07/09/25 1338   Pain of toe of right foot     Disposition     As a result of the work-up, the patient was discharged home.  she was informed of her diagnosis and instructed to come back with any concerns or worsening of condition.  she was agreeable to the plan as discussed above.  she was given the opportunity to ask questions.  All of the patient's questions were answered.      Procedures   Procedures    Patient was seen independently    Jazzy Drake PA-C  Emergency Medicine      Jazzy Drake PA-C  07/09/25 [id number]

## 2025-07-09 NOTE — Clinical Note
Steven Taylor was seen and treated in our emergency department on 7/9/2025.  She may return to work on 07/14/2025.       If you have any questions or concerns, please don't hesitate to call.      Jazzy Drake PA-C

## 2025-07-09 NOTE — DISCHARGE INSTRUCTIONS
You were seen here for right big toe pain.  You did receive a tetanus vaccination while here.  I recommend that you ice and rest the foot.  Take Tylenol/ibuprofen as needed for pain management.  Look for signs/symptoms of infection including purulent drainage from the toe, increased pain, increased redness/swelling, fever, chills, nausea/vomiting.  If you were to develop the symptoms please return to the ED for further management.  Follow-up with primary care to ensure improving of symptoms.

## 2025-08-07 ENCOUNTER — APPOINTMENT (OUTPATIENT)
Dept: PRIMARY CARE | Facility: CLINIC | Age: 20
End: 2025-08-07
Payer: COMMERCIAL

## 2025-08-08 ENCOUNTER — HOSPITAL ENCOUNTER (EMERGENCY)
Facility: HOSPITAL | Age: 20
Discharge: HOME | End: 2025-08-08
Payer: COMMERCIAL

## 2025-08-08 VITALS
BODY MASS INDEX: 23.04 KG/M2 | TEMPERATURE: 98.1 F | OXYGEN SATURATION: 100 % | WEIGHT: 130 LBS | HEART RATE: 94 BPM | RESPIRATION RATE: 17 BRPM | SYSTOLIC BLOOD PRESSURE: 129 MMHG | DIASTOLIC BLOOD PRESSURE: 84 MMHG | HEIGHT: 63 IN

## 2025-08-08 DIAGNOSIS — Z51.89 VISIT FOR WOUND CHECK: Primary | ICD-10-CM

## 2025-08-08 DIAGNOSIS — S90.424A BLISTER OF TOE OF RIGHT FOOT WITHOUT INFECTION, INITIAL ENCOUNTER: ICD-10-CM

## 2025-08-08 PROCEDURE — 99281 EMR DPT VST MAYX REQ PHY/QHP: CPT

## 2025-08-08 PROCEDURE — 99283 EMERGENCY DEPT VISIT LOW MDM: CPT

## 2025-08-08 ASSESSMENT — PAIN - FUNCTIONAL ASSESSMENT: PAIN_FUNCTIONAL_ASSESSMENT: 0-10

## 2025-08-08 NOTE — ED PROVIDER NOTES
"HPI   Chief Complaint   Patient presents with    Wound Check       HPI  Steven Taylor is a 20-year-old female with no significant medical history presenting the ED for a wound check.  Patient states a few weeks ago she had a \"blood clot\" to the right big toe.  Patient states she was seen in the emergency department for initial evaluation and was told to come back if the blood clot popped.  Patient states about 3 days ago the \"blood clot\" popped.  Patient denies pain, swelling, redness to the right big toe.  Patient denies difficulty with walking.  Patient denies chest pain, shortness of breath, abdominal pain, nausea, vomiting, fevers, body aches, chills      Patient History   Medical History[1]  Surgical History[2]  Family History[3]  Social History[4]    Physical Exam   ED Triage Vitals [08/08/25 1356]   Temperature Heart Rate Respirations BP   36.7 °C (98.1 °F) 94 17 129/84      Pulse Ox Temp Source Heart Rate Source Patient Position   100 % Oral Monitor Sitting      BP Location FiO2 (%)     Left arm --       Physical Exam  Vitals and nursing note reviewed.   Constitutional:       Appearance: Normal appearance.     Cardiovascular:      Rate and Rhythm: Normal rate and regular rhythm.      Heart sounds: Normal heart sounds. No murmur heard.     No gallop.   Pulmonary:      Effort: Pulmonary effort is normal. No respiratory distress.      Breath sounds: Normal breath sounds. No stridor. No wheezing, rhonchi or rales.     Musculoskeletal:      Comments: 2+ DP pulses bilaterally.  Please see photo below for image of the wound to the plantar right first toe.  The wound is not actively bleeding.  There is dried blood to the wound.  No tenderness, swelling, purulent drainage, erythema to the wound.     Skin:     General: Skin is warm and dry.     Neurological:      General: No focal deficit present.      Mental Status: She is alert and oriented to person, place, and time.     Psychiatric:         Mood and Affect: Mood " normal.         Behavior: Behavior normal.           ED Course & MDM   Diagnoses as of 08/08/25 4804   Visit for wound check   Blister of toe of right foot without infection, initial encounter                 No data recorded     Gena Coma Scale Score: 15 (08/08/25 1356 : Juana Galeano RN)                           Medical Decision Making  This is a 20-year-old female presenting the ED for a wound check.  Per chart review the patient was initially seen in the emergency department on July 9 for evaluation of a wound on the right big toe.  At that time patient had a quarter sized ecchymosis to the plantar right big toe.  Patient was discharged home advised to follow-up with primary care provider and return to the emergency department if signs of infection appear.  On exam today it appears that the patient had a blister to the plantar right big toe and the blister popped.  Patient does have dried blood surrounding the area where the blister was.  The new skin appears to be healthy.  No purulent drainage, swelling, erythema, tenderness.  Low suspicion for infection, cellulitis, osteomyelitis, septic joint at this time.  Discussed with patient the wound on her toe was healing very well.  Discussed with patient she should not remove the old skin and blood on the toe as the skin and blood will come off when it is ready.  Given the toe appears to be healing well and there is no tenderness to the toe there is low suspicion for bony abnormalities at this time therefore x-ray of the right foot not obtained today.  Patient has been hemodynamically stable in the emergency department today.  Gait is intact and patient is able to bear weight on the right foot.    Disposition: Discharge home  Patient advised to follow-up with her primary care divider for further evaluation management of the wound on her toe.  Referral and contact information to the primary care clinic has been included in discharge paperwork.  Strict return  precautions including signs infections were given to the patient.  Plan was discussed with the patient the patient understands and agrees.  Patient was discharged in stable condition.    Procedure  Procedures         [1]   Past Medical History:  Diagnosis Date    Chlamydia     Gonorrhea    [2] No past surgical history on file.  [3]   Family History  Problem Relation Name Age of Onset    No Known Problems Mother      No Known Problems Father      Asthma Brother      Eczema Brother      Colon cancer Paternal Grandmother  50    Diabetes Maternal Great-Grandmother     [4]   Social History  Tobacco Use    Smoking status: Never    Smokeless tobacco: Never   Vaping Use    Vaping status: Never Used   Substance Use Topics    Alcohol use: Not Currently    Drug use: Not Currently        Chivo Bonilla PA-C  08/08/25 [id number]

## 2025-08-08 NOTE — ED TRIAGE NOTES
Pt states she was seen for a blood clot on her right toe, was given medication and told to return if it burst. Pt states it has burst

## 2025-08-08 NOTE — DISCHARGE INSTRUCTIONS
Please follow-up with your primary care provider for further evaluation and management of the wound to toe.  Return to the emergency department if you notice signs of infection to toe such as purulent drainage, swelling, increased redness, persistent fevers.  Also return to the emergency department if any new symptoms began.  Referral and contact information to the primary care clinic has been included in discharge paperwork.